# Patient Record
Sex: MALE | Race: BLACK OR AFRICAN AMERICAN
[De-identification: names, ages, dates, MRNs, and addresses within clinical notes are randomized per-mention and may not be internally consistent; named-entity substitution may affect disease eponyms.]

---

## 2022-05-06 ENCOUNTER — HOSPITAL ENCOUNTER (EMERGENCY)
Dept: HOSPITAL 94 - ER | Age: 22
LOS: 1 days | Discharge: HOME | End: 2022-05-07
Payer: COMMERCIAL

## 2022-05-06 VITALS — BODY MASS INDEX: 19.64 KG/M2 | WEIGHT: 140.3 LBS | HEIGHT: 71 IN
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
## 2022-05-06 DIAGNOSIS — F10.929: Primary | ICD-10-CM

## 2022-05-06 PROCEDURE — 80320 DRUG SCREEN QUANTALCOHOLS: CPT

## 2022-05-06 PROCEDURE — 99285 EMERGENCY DEPT VISIT HI MDM: CPT

## 2022-05-06 PROCEDURE — 96360 HYDRATION IV INFUSION INIT: CPT

## 2022-05-06 PROCEDURE — 36415 COLL VENOUS BLD VENIPUNCTURE: CPT

## 2022-05-06 PROCEDURE — 96361 HYDRATE IV INFUSION ADD-ON: CPT

## 2022-05-06 NOTE — NUR
PT IS NOW MORE AWAKE AND ALERT. ATTEMPTING TO REACH A RIDE VIA ONLINE SOCIAL 
MEDIA. PT DOES NOT RECALL ANY PHONE NUMBERS OF PEOPLE THAT CAN COME PICK HIM 
UP.

## 2022-05-07 VITALS — SYSTOLIC BLOOD PRESSURE: 127 MMHG | DIASTOLIC BLOOD PRESSURE: 75 MMHG
